# Patient Record
Sex: FEMALE | Race: WHITE
[De-identification: names, ages, dates, MRNs, and addresses within clinical notes are randomized per-mention and may not be internally consistent; named-entity substitution may affect disease eponyms.]

---

## 2020-12-17 ENCOUNTER — HOSPITAL ENCOUNTER (INPATIENT)
Age: 75
LOS: 23 days | Discharge: HOME HEALTH | DRG: 561 | End: 2021-01-09
Payer: MEDICARE

## 2020-12-17 VITALS
RESPIRATION RATE: 18 BRPM | HEART RATE: 78 BPM | SYSTOLIC BLOOD PRESSURE: 125 MMHG | TEMPERATURE: 97.52 F | DIASTOLIC BLOOD PRESSURE: 67 MMHG | OXYGEN SATURATION: 92 %

## 2020-12-17 VITALS
HEART RATE: 78 BPM | OXYGEN SATURATION: 92 % | DIASTOLIC BLOOD PRESSURE: 67 MMHG | SYSTOLIC BLOOD PRESSURE: 125 MMHG | TEMPERATURE: 97.6 F | RESPIRATION RATE: 18 BRPM

## 2020-12-17 VITALS — HEART RATE: 78 BPM | OXYGEN SATURATION: 92 % | RESPIRATION RATE: 18 BRPM

## 2020-12-17 VITALS — BODY MASS INDEX: 21.5 KG/M2 | BODY MASS INDEX: 21.6 KG/M2

## 2020-12-17 DIAGNOSIS — E78.5: ICD-10-CM

## 2020-12-17 DIAGNOSIS — G62.9: ICD-10-CM

## 2020-12-17 DIAGNOSIS — I10: ICD-10-CM

## 2020-12-17 DIAGNOSIS — E21.0: ICD-10-CM

## 2020-12-17 DIAGNOSIS — F32.9: ICD-10-CM

## 2020-12-17 DIAGNOSIS — S72.002D: Primary | ICD-10-CM

## 2020-12-17 DIAGNOSIS — Z87.891: ICD-10-CM

## 2020-12-17 DIAGNOSIS — G43.909: ICD-10-CM
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
## 2020-12-17 PROCEDURE — 87186 SC STD MICRODIL/AGAR DIL: CPT

## 2020-12-17 PROCEDURE — 81001 URINALYSIS AUTO W/SCOPE: CPT

## 2020-12-17 PROCEDURE — 97802 MEDICAL NUTRITION INDIV IN: CPT

## 2020-12-17 PROCEDURE — 97162 PT EVAL MOD COMPLEX 30 MIN: CPT

## 2020-12-17 PROCEDURE — 97110 THERAPEUTIC EXERCISES: CPT

## 2020-12-17 PROCEDURE — 80048 BASIC METABOLIC PNL TOTAL CA: CPT

## 2020-12-17 PROCEDURE — 85025 COMPLETE CBC W/AUTO DIFF WBC: CPT

## 2020-12-17 PROCEDURE — 36415 COLL VENOUS BLD VENIPUNCTURE: CPT

## 2020-12-17 PROCEDURE — 97535 SELF CARE MNGMENT TRAINING: CPT

## 2020-12-17 PROCEDURE — 87088 URINE BACTERIA CULTURE: CPT

## 2020-12-17 PROCEDURE — 97530 THERAPEUTIC ACTIVITIES: CPT

## 2020-12-17 PROCEDURE — U0003 INFECTIOUS AGENT DETECTION BY NUCLEIC ACID (DNA OR RNA); SEVERE ACUTE RESPIRATORY SYNDROME CORONAVIRUS 2 (SARS-COV-2) (CORONAVIRUS DISEASE [COVID-19]), AMPLIFIED PROBE TECHNIQUE, MAKING USE OF HIGH THROUGHPUT TECHNOLOGIES AS DESCRIBED BY CMS-2020-01-R: HCPCS

## 2020-12-17 PROCEDURE — 87086 URINE CULTURE/COLONY COUNT: CPT

## 2020-12-17 PROCEDURE — 97116 GAIT TRAINING THERAPY: CPT

## 2020-12-17 PROCEDURE — 87426 SARSCOV CORONAVIRUS AG IA: CPT

## 2020-12-17 PROCEDURE — 97166 OT EVAL MOD COMPLEX 45 MIN: CPT

## 2020-12-17 PROCEDURE — 87077 CULTURE AEROBIC IDENTIFY: CPT

## 2020-12-17 PROCEDURE — 87635 SARS-COV-2 COVID-19 AMP PRB: CPT

## 2020-12-18 VITALS
TEMPERATURE: 98 F | DIASTOLIC BLOOD PRESSURE: 61 MMHG | RESPIRATION RATE: 16 BRPM | OXYGEN SATURATION: 96 % | SYSTOLIC BLOOD PRESSURE: 118 MMHG | HEART RATE: 65 BPM

## 2020-12-18 VITALS
TEMPERATURE: 98.24 F | HEART RATE: 66 BPM | SYSTOLIC BLOOD PRESSURE: 136 MMHG | RESPIRATION RATE: 16 BRPM | DIASTOLIC BLOOD PRESSURE: 80 MMHG | OXYGEN SATURATION: 97 %

## 2020-12-18 LAB
ANION GAP: 4 (ref 5–15)
BUN SERPL-MCNC: 20 MG/DL (ref 7–18)
BUN/CREAT RATIO: 41.2 RATIO (ref 10–20)
CALCIUM SERPL-MCNC: 9.3 MG/DL (ref 8.5–10.1)
CARBON DIOXIDE: 30 MMOL/L (ref 21–32)
CHLORIDE: 105 MMOL/L (ref 98–107)
DEPRECATED RDW RBC: 44.8 FL (ref 35.1–43.9)
ERYTHROCYTE [DISTWIDTH] IN BLOOD: 13.2 % (ref 11.6–14.6)
EST GLOM FILT RATE - AFR AMER: 159 ML/MIN (ref 60–?)
ESTIMATED CREATININE CLEARANCE: 43.07 ML/MIN
GLUCOSE: 81 MG/DL (ref 74–106)
HCT VFR BLD AUTO: 36.3 % (ref 37–47)
HEMOGLOBIN: 11.5 G/DL (ref 12–15)
HGB BLD-MCNC: 11.5 G/DL (ref 12–15)
IMMATURE GRANULOCYTES COUNT: 0.02 X10^3/UL (ref 0–0)
MCV RBC: 92.6 FL (ref 81–99)
MEAN CORP HGB CONC: 31.7 G/DL (ref 32–36)
MEAN PLATELET VOL.: 9.8 FL (ref 6.2–12)
NRBC FLAGGED BY ANALYZER: 0 % (ref 0–5)
PLATELET # BLD: 319 K/MM3 (ref 150–450)
PLATELET COUNT: 319 K/MM3 (ref 150–450)
POTASSIUM: 3.3 MMOL/L (ref 3.5–5.1)
RBC # BLD AUTO: 3.92 M/MM3 (ref 4.2–5.4)
RBC DISTRIBUTION WIDTH CV: 13.2 % (ref 11.6–14.6)
RBC DISTRIBUTION WIDTH SD: 44.8 FL (ref 35.1–43.9)
WBC # BLD AUTO: 7.3 K/MM3 (ref 4.4–11)
WHITE BLOOD COUNT: 7.3 K/MM3 (ref 4.4–11)

## 2020-12-19 VITALS
TEMPERATURE: 97.88 F | OXYGEN SATURATION: 93 % | HEART RATE: 68 BPM | SYSTOLIC BLOOD PRESSURE: 102 MMHG | RESPIRATION RATE: 16 BRPM | DIASTOLIC BLOOD PRESSURE: 59 MMHG

## 2020-12-19 VITALS
RESPIRATION RATE: 16 BRPM | TEMPERATURE: 97.4 F | DIASTOLIC BLOOD PRESSURE: 75 MMHG | SYSTOLIC BLOOD PRESSURE: 146 MMHG | OXYGEN SATURATION: 93 % | HEART RATE: 67 BPM

## 2020-12-20 VITALS
TEMPERATURE: 98.6 F | HEART RATE: 63 BPM | SYSTOLIC BLOOD PRESSURE: 116 MMHG | DIASTOLIC BLOOD PRESSURE: 62 MMHG | RESPIRATION RATE: 16 BRPM | OXYGEN SATURATION: 96 %

## 2020-12-20 VITALS
DIASTOLIC BLOOD PRESSURE: 63 MMHG | OXYGEN SATURATION: 97 % | TEMPERATURE: 98.06 F | HEART RATE: 68 BPM | RESPIRATION RATE: 18 BRPM | SYSTOLIC BLOOD PRESSURE: 112 MMHG

## 2020-12-20 LAB
ANION GAP: 3 (ref 5–15)
BUN SERPL-MCNC: 18 MG/DL (ref 7–18)
BUN/CREAT RATIO: 35.3 RATIO (ref 10–20)
CALCIUM SERPL-MCNC: 9.3 MG/DL (ref 8.5–10.1)
CARBON DIOXIDE: 31 MMOL/L (ref 21–32)
CHLORIDE: 106 MMOL/L (ref 98–107)
EST GLOM FILT RATE - AFR AMER: 150 ML/MIN (ref 60–?)
ESTIMATED CREATININE CLEARANCE: 43.07 ML/MIN
GLUCOSE: 86 MG/DL (ref 74–106)
POTASSIUM: 4.3 MMOL/L (ref 3.5–5.1)

## 2020-12-21 VITALS
OXYGEN SATURATION: 97 % | SYSTOLIC BLOOD PRESSURE: 141 MMHG | DIASTOLIC BLOOD PRESSURE: 82 MMHG | HEART RATE: 64 BPM | RESPIRATION RATE: 18 BRPM | TEMPERATURE: 98.2 F

## 2020-12-21 VITALS
TEMPERATURE: 97.2 F | DIASTOLIC BLOOD PRESSURE: 63 MMHG | OXYGEN SATURATION: 95 % | SYSTOLIC BLOOD PRESSURE: 113 MMHG | HEART RATE: 63 BPM | RESPIRATION RATE: 18 BRPM

## 2020-12-22 VITALS
DIASTOLIC BLOOD PRESSURE: 49 MMHG | HEART RATE: 61 BPM | SYSTOLIC BLOOD PRESSURE: 102 MMHG | RESPIRATION RATE: 16 BRPM | OXYGEN SATURATION: 95 % | TEMPERATURE: 97.9 F

## 2020-12-22 VITALS
DIASTOLIC BLOOD PRESSURE: 62 MMHG | RESPIRATION RATE: 18 BRPM | OXYGEN SATURATION: 94 % | TEMPERATURE: 97.34 F | SYSTOLIC BLOOD PRESSURE: 138 MMHG | HEART RATE: 63 BPM

## 2020-12-23 VITALS — HEART RATE: 58 BPM | RESPIRATION RATE: 18 BRPM | OXYGEN SATURATION: 96 %

## 2020-12-23 VITALS
DIASTOLIC BLOOD PRESSURE: 51 MMHG | OXYGEN SATURATION: 95 % | SYSTOLIC BLOOD PRESSURE: 129 MMHG | TEMPERATURE: 98.06 F | RESPIRATION RATE: 16 BRPM | HEART RATE: 58 BPM

## 2020-12-23 VITALS
OXYGEN SATURATION: 96 % | SYSTOLIC BLOOD PRESSURE: 129 MMHG | TEMPERATURE: 98 F | RESPIRATION RATE: 18 BRPM | HEART RATE: 60 BPM | DIASTOLIC BLOOD PRESSURE: 50 MMHG

## 2020-12-24 VITALS
DIASTOLIC BLOOD PRESSURE: 54 MMHG | SYSTOLIC BLOOD PRESSURE: 114 MMHG | RESPIRATION RATE: 16 BRPM | TEMPERATURE: 97.34 F | OXYGEN SATURATION: 97 % | HEART RATE: 59 BPM

## 2020-12-24 VITALS
TEMPERATURE: 97.9 F | RESPIRATION RATE: 16 BRPM | DIASTOLIC BLOOD PRESSURE: 52 MMHG | HEART RATE: 60 BPM | OXYGEN SATURATION: 96 % | SYSTOLIC BLOOD PRESSURE: 118 MMHG

## 2020-12-25 VITALS
TEMPERATURE: 97.8 F | OXYGEN SATURATION: 96 % | HEART RATE: 55 BPM | DIASTOLIC BLOOD PRESSURE: 58 MMHG | RESPIRATION RATE: 16 BRPM | SYSTOLIC BLOOD PRESSURE: 123 MMHG

## 2020-12-25 VITALS
RESPIRATION RATE: 16 BRPM | OXYGEN SATURATION: 95 % | TEMPERATURE: 98.24 F | HEART RATE: 65 BPM | SYSTOLIC BLOOD PRESSURE: 111 MMHG | DIASTOLIC BLOOD PRESSURE: 50 MMHG

## 2020-12-25 VITALS — HEART RATE: 62 BPM | RESPIRATION RATE: 16 BRPM | OXYGEN SATURATION: 95 %

## 2020-12-25 LAB
ANION GAP: 3 (ref 5–15)
BUN SERPL-MCNC: 23 MG/DL (ref 7–18)
BUN/CREAT RATIO: 38.2 RATIO (ref 10–20)
CALCIUM SERPL-MCNC: 9.5 MG/DL (ref 8.5–10.1)
CARBON DIOXIDE: 29 MMOL/L (ref 21–32)
CHLORIDE: 106 MMOL/L (ref 98–107)
DEPRECATED RDW RBC: 47.1 FL (ref 35.1–43.9)
ERYTHROCYTE [DISTWIDTH] IN BLOOD: 13.6 % (ref 11.6–14.6)
EST GLOM FILT RATE - AFR AMER: 124 ML/MIN (ref 60–?)
ESTIMATED CREATININE CLEARANCE: 43.07 ML/MIN
GLUCOSE: 82 MG/DL (ref 74–106)
HCT VFR BLD AUTO: 37.3 % (ref 37–47)
HEMOGLOBIN: 11.7 G/DL (ref 12–15)
HGB BLD-MCNC: 11.7 G/DL (ref 12–15)
IMMATURE GRANULOCYTES COUNT: 0.05 X10^3/UL (ref 0–0)
MCV RBC: 94.4 FL (ref 81–99)
MEAN CORP HGB CONC: 31.4 G/DL (ref 32–36)
MEAN PLATELET VOL.: 9.2 FL (ref 6.2–12)
NRBC FLAGGED BY ANALYZER: 0 % (ref 0–5)
PLATELET # BLD: 471 K/MM3 (ref 150–450)
PLATELET COUNT: 471 K/MM3 (ref 150–450)
POTASSIUM: 4.3 MMOL/L (ref 3.5–5.1)
RBC # BLD AUTO: 3.95 M/MM3 (ref 4.2–5.4)
RBC DISTRIBUTION WIDTH CV: 13.6 % (ref 11.6–14.6)
RBC DISTRIBUTION WIDTH SD: 47.1 FL (ref 35.1–43.9)
WBC # BLD AUTO: 8.9 K/MM3 (ref 4.4–11)
WHITE BLOOD COUNT: 8.9 K/MM3 (ref 4.4–11)

## 2020-12-26 VITALS
SYSTOLIC BLOOD PRESSURE: 121 MMHG | DIASTOLIC BLOOD PRESSURE: 60 MMHG | HEART RATE: 57 BPM | TEMPERATURE: 98.3 F | RESPIRATION RATE: 16 BRPM | OXYGEN SATURATION: 93 %

## 2020-12-26 VITALS
DIASTOLIC BLOOD PRESSURE: 75 MMHG | OXYGEN SATURATION: 94 % | SYSTOLIC BLOOD PRESSURE: 135 MMHG | TEMPERATURE: 97.3 F | HEART RATE: 59 BPM | RESPIRATION RATE: 16 BRPM

## 2020-12-27 VITALS
HEART RATE: 58 BPM | RESPIRATION RATE: 16 BRPM | TEMPERATURE: 97.52 F | OXYGEN SATURATION: 95 % | DIASTOLIC BLOOD PRESSURE: 63 MMHG | SYSTOLIC BLOOD PRESSURE: 109 MMHG

## 2020-12-27 VITALS
DIASTOLIC BLOOD PRESSURE: 73 MMHG | HEART RATE: 62 BPM | SYSTOLIC BLOOD PRESSURE: 142 MMHG | RESPIRATION RATE: 16 BRPM | OXYGEN SATURATION: 97 % | TEMPERATURE: 97.4 F

## 2020-12-27 VITALS — RESPIRATION RATE: 16 BRPM | HEART RATE: 64 BPM | OXYGEN SATURATION: 97 %

## 2020-12-28 ENCOUNTER — HOSPITAL ENCOUNTER (OUTPATIENT)
Age: 75
End: 2020-12-28
Payer: MEDICARE

## 2020-12-28 VITALS
OXYGEN SATURATION: 96 % | TEMPERATURE: 98 F | HEART RATE: 58 BPM | RESPIRATION RATE: 16 BRPM | SYSTOLIC BLOOD PRESSURE: 112 MMHG | DIASTOLIC BLOOD PRESSURE: 62 MMHG

## 2020-12-28 VITALS
DIASTOLIC BLOOD PRESSURE: 79 MMHG | SYSTOLIC BLOOD PRESSURE: 107 MMHG | TEMPERATURE: 98.06 F | RESPIRATION RATE: 16 BRPM | OXYGEN SATURATION: 96 % | HEART RATE: 66 BPM

## 2020-12-28 VITALS — BODY MASS INDEX: 21.5 KG/M2

## 2020-12-28 DIAGNOSIS — M79.89: Primary | ICD-10-CM

## 2020-12-28 LAB
KETONE-DIPSTICK: 5 MG/DL
LEUKOCYTE ESTERASE UR QL STRIP: 500 /UL
MUCOUS THREADS URNS QL MICRO: (no result) /HPF
PROT UR QL STRIP.AUTO: 100 MG/DL
RBC UR QL: (no result) /HPF (ref 0–5)
RBC UR QL: 50 /UL
SP GR UR: 1.02 (ref 1–1.03)
SQUAMOUS URNS QL MICRO: (no result) /HPF (ref 5–10)
URINE PRESERVATIVE: (no result)

## 2020-12-28 PROCEDURE — 93971 EXTREMITY STUDY: CPT

## 2020-12-29 VITALS
HEART RATE: 71 BPM | RESPIRATION RATE: 18 BRPM | DIASTOLIC BLOOD PRESSURE: 71 MMHG | TEMPERATURE: 98.1 F | OXYGEN SATURATION: 94 % | SYSTOLIC BLOOD PRESSURE: 105 MMHG

## 2020-12-29 VITALS
HEART RATE: 60 BPM | DIASTOLIC BLOOD PRESSURE: 53 MMHG | RESPIRATION RATE: 16 BRPM | SYSTOLIC BLOOD PRESSURE: 125 MMHG | TEMPERATURE: 98.24 F | OXYGEN SATURATION: 95 %

## 2020-12-30 VITALS
DIASTOLIC BLOOD PRESSURE: 42 MMHG | HEART RATE: 70 BPM | OXYGEN SATURATION: 96 % | RESPIRATION RATE: 16 BRPM | SYSTOLIC BLOOD PRESSURE: 138 MMHG | TEMPERATURE: 97.52 F

## 2020-12-30 VITALS
RESPIRATION RATE: 16 BRPM | DIASTOLIC BLOOD PRESSURE: 57 MMHG | HEART RATE: 58 BPM | OXYGEN SATURATION: 97 % | SYSTOLIC BLOOD PRESSURE: 128 MMHG | TEMPERATURE: 98.3 F

## 2020-12-31 VITALS
DIASTOLIC BLOOD PRESSURE: 56 MMHG | OXYGEN SATURATION: 98 % | RESPIRATION RATE: 16 BRPM | SYSTOLIC BLOOD PRESSURE: 122 MMHG | HEART RATE: 58 BPM | TEMPERATURE: 98.6 F

## 2020-12-31 VITALS
OXYGEN SATURATION: 98 % | SYSTOLIC BLOOD PRESSURE: 113 MMHG | HEART RATE: 58 BPM | RESPIRATION RATE: 16 BRPM | TEMPERATURE: 98.06 F | DIASTOLIC BLOOD PRESSURE: 66 MMHG

## 2020-12-31 VITALS — HEART RATE: 62 BPM | OXYGEN SATURATION: 96 % | RESPIRATION RATE: 16 BRPM

## 2021-01-01 VITALS
OXYGEN SATURATION: 98 % | TEMPERATURE: 97.9 F | HEART RATE: 68 BPM | RESPIRATION RATE: 16 BRPM | SYSTOLIC BLOOD PRESSURE: 118 MMHG | DIASTOLIC BLOOD PRESSURE: 70 MMHG

## 2021-01-01 LAB
ANION GAP: 2 (ref 5–15)
BUN SERPL-MCNC: 27 MG/DL (ref 7–18)
BUN/CREAT RATIO: 39.6 RATIO (ref 10–20)
CALCIUM SERPL-MCNC: 9.8 MG/DL (ref 8.5–10.1)
CARBON DIOXIDE: 31 MMOL/L (ref 21–32)
CHLORIDE: 109 MMOL/L (ref 98–107)
DEPRECATED RDW RBC: 50.9 FL (ref 35.1–43.9)
ERYTHROCYTE [DISTWIDTH] IN BLOOD: 14.6 % (ref 11.6–14.6)
EST GLOM FILT RATE - AFR AMER: 108 ML/MIN (ref 60–?)
ESTIMATED CREATININE CLEARANCE: 43.07 ML/MIN
GLUCOSE: 75 MG/DL (ref 74–106)
HCT VFR BLD AUTO: 38.9 % (ref 37–47)
HEMOGLOBIN: 12.1 G/DL (ref 12–15)
HGB BLD-MCNC: 12.1 G/DL (ref 12–15)
IMMATURE GRANULOCYTES COUNT: 0.03 X10^3/UL (ref 0–0)
MCV RBC: 95.6 FL (ref 81–99)
MEAN CORP HGB CONC: 31.1 G/DL (ref 32–36)
MEAN PLATELET VOL.: 9.3 FL (ref 6.2–12)
NRBC FLAGGED BY ANALYZER: 0 % (ref 0–5)
PLATELET # BLD: 519 K/MM3 (ref 150–450)
PLATELET COUNT: 519 K/MM3 (ref 150–450)
POTASSIUM: 4.4 MMOL/L (ref 3.5–5.1)
RBC # BLD AUTO: 4.07 M/MM3 (ref 4.2–5.4)
RBC DISTRIBUTION WIDTH CV: 14.6 % (ref 11.6–14.6)
RBC DISTRIBUTION WIDTH SD: 50.9 FL (ref 35.1–43.9)
WBC # BLD AUTO: 7.1 K/MM3 (ref 4.4–11)
WHITE BLOOD COUNT: 7.1 K/MM3 (ref 4.4–11)

## 2021-01-02 VITALS
HEART RATE: 62 BPM | TEMPERATURE: 97.52 F | DIASTOLIC BLOOD PRESSURE: 47 MMHG | OXYGEN SATURATION: 96 % | SYSTOLIC BLOOD PRESSURE: 112 MMHG | RESPIRATION RATE: 16 BRPM

## 2021-01-02 VITALS
RESPIRATION RATE: 18 BRPM | TEMPERATURE: 97.88 F | HEART RATE: 57 BPM | DIASTOLIC BLOOD PRESSURE: 73 MMHG | SYSTOLIC BLOOD PRESSURE: 115 MMHG | OXYGEN SATURATION: 96 %

## 2021-01-03 VITALS
HEART RATE: 60 BPM | OXYGEN SATURATION: 94 % | RESPIRATION RATE: 18 BRPM | TEMPERATURE: 97.34 F | DIASTOLIC BLOOD PRESSURE: 68 MMHG | SYSTOLIC BLOOD PRESSURE: 119 MMHG

## 2021-01-03 VITALS
OXYGEN SATURATION: 97 % | TEMPERATURE: 98.8 F | HEART RATE: 57 BPM | DIASTOLIC BLOOD PRESSURE: 56 MMHG | SYSTOLIC BLOOD PRESSURE: 107 MMHG | RESPIRATION RATE: 16 BRPM

## 2021-01-04 VITALS
TEMPERATURE: 96.8 F | RESPIRATION RATE: 16 BRPM | HEART RATE: 61 BPM | SYSTOLIC BLOOD PRESSURE: 111 MMHG | DIASTOLIC BLOOD PRESSURE: 54 MMHG | OXYGEN SATURATION: 96 %

## 2021-01-04 VITALS
TEMPERATURE: 96.98 F | HEART RATE: 58 BPM | OXYGEN SATURATION: 98 % | DIASTOLIC BLOOD PRESSURE: 72 MMHG | RESPIRATION RATE: 16 BRPM | SYSTOLIC BLOOD PRESSURE: 104 MMHG

## 2021-01-04 VITALS — OXYGEN SATURATION: 99 % | RESPIRATION RATE: 16 BRPM | HEART RATE: 61 BPM

## 2021-01-05 VITALS
DIASTOLIC BLOOD PRESSURE: 59 MMHG | HEART RATE: 56 BPM | OXYGEN SATURATION: 95 % | RESPIRATION RATE: 16 BRPM | SYSTOLIC BLOOD PRESSURE: 109 MMHG | TEMPERATURE: 97.88 F

## 2021-01-05 VITALS
RESPIRATION RATE: 18 BRPM | DIASTOLIC BLOOD PRESSURE: 79 MMHG | OXYGEN SATURATION: 93 % | SYSTOLIC BLOOD PRESSURE: 103 MMHG | HEART RATE: 62 BPM | TEMPERATURE: 98.42 F

## 2021-01-06 VITALS
DIASTOLIC BLOOD PRESSURE: 53 MMHG | SYSTOLIC BLOOD PRESSURE: 114 MMHG | OXYGEN SATURATION: 98 % | RESPIRATION RATE: 16 BRPM | HEART RATE: 59 BPM | TEMPERATURE: 97.7 F

## 2021-01-06 VITALS
OXYGEN SATURATION: 92 % | SYSTOLIC BLOOD PRESSURE: 112 MMHG | HEART RATE: 60 BPM | TEMPERATURE: 98.6 F | DIASTOLIC BLOOD PRESSURE: 53 MMHG | RESPIRATION RATE: 16 BRPM

## 2021-01-06 VITALS — HEART RATE: 60 BPM | OXYGEN SATURATION: 92 % | RESPIRATION RATE: 16 BRPM

## 2021-01-07 VITALS
DIASTOLIC BLOOD PRESSURE: 56 MMHG | HEART RATE: 64 BPM | OXYGEN SATURATION: 98 % | RESPIRATION RATE: 16 BRPM | TEMPERATURE: 97.88 F | SYSTOLIC BLOOD PRESSURE: 100 MMHG

## 2021-01-07 VITALS
TEMPERATURE: 96.98 F | HEART RATE: 80 BPM | RESPIRATION RATE: 18 BRPM | OXYGEN SATURATION: 97 % | DIASTOLIC BLOOD PRESSURE: 56 MMHG | SYSTOLIC BLOOD PRESSURE: 103 MMHG

## 2021-01-08 VITALS
OXYGEN SATURATION: 95 % | RESPIRATION RATE: 16 BRPM | TEMPERATURE: 98.4 F | HEART RATE: 60 BPM | SYSTOLIC BLOOD PRESSURE: 123 MMHG | DIASTOLIC BLOOD PRESSURE: 65 MMHG

## 2021-01-08 VITALS
RESPIRATION RATE: 16 BRPM | SYSTOLIC BLOOD PRESSURE: 112 MMHG | OXYGEN SATURATION: 97 % | DIASTOLIC BLOOD PRESSURE: 66 MMHG | HEART RATE: 58 BPM | TEMPERATURE: 97.52 F

## 2021-01-08 VITALS — RESPIRATION RATE: 16 BRPM | HEART RATE: 60 BPM | OXYGEN SATURATION: 95 %

## 2021-01-08 LAB
ANION GAP: 1 (ref 5–15)
BUN SERPL-MCNC: 19 MG/DL (ref 7–18)
BUN/CREAT RATIO: 24.3 RATIO (ref 10–20)
CALCIUM SERPL-MCNC: 9.9 MG/DL (ref 8.5–10.1)
CARBON DIOXIDE: 32 MMOL/L (ref 21–32)
CHLORIDE: 107 MMOL/L (ref 98–107)
DEPRECATED RDW RBC: 53 FL (ref 35.1–43.9)
ERYTHROCYTE [DISTWIDTH] IN BLOOD: 15 % (ref 11.6–14.6)
EST GLOM FILT RATE - AFR AMER: 92 ML/MIN (ref 60–?)
ESTIMATED CREATININE CLEARANCE: 43.07 ML/MIN
GLUCOSE: 80 MG/DL (ref 74–106)
HCT VFR BLD AUTO: 39.6 % (ref 37–47)
HEMOGLOBIN: 12.3 G/DL (ref 12–15)
HGB BLD-MCNC: 12.3 G/DL (ref 12–15)
IMMATURE GRANULOCYTES COUNT: 0.02 X10^3/UL (ref 0–0)
MCV RBC: 96.1 FL (ref 81–99)
MEAN CORP HGB CONC: 31.1 G/DL (ref 32–36)
MEAN PLATELET VOL.: 9.5 FL (ref 6.2–12)
NRBC FLAGGED BY ANALYZER: 0 % (ref 0–5)
PLATELET # BLD: 384 K/MM3 (ref 150–450)
PLATELET COUNT: 384 K/MM3 (ref 150–450)
POTASSIUM: 4.4 MMOL/L (ref 3.5–5.1)
RBC # BLD AUTO: 4.12 M/MM3 (ref 4.2–5.4)
RBC DISTRIBUTION WIDTH CV: 15 % (ref 11.6–14.6)
RBC DISTRIBUTION WIDTH SD: 53 FL (ref 35.1–43.9)
WBC # BLD AUTO: 6.7 K/MM3 (ref 4.4–11)
WHITE BLOOD COUNT: 6.7 K/MM3 (ref 4.4–11)

## 2021-01-09 VITALS
DIASTOLIC BLOOD PRESSURE: 63 MMHG | SYSTOLIC BLOOD PRESSURE: 120 MMHG | RESPIRATION RATE: 16 BRPM | OXYGEN SATURATION: 94 % | TEMPERATURE: 98.6 F | HEART RATE: 64 BPM

## 2021-01-09 VITALS
HEART RATE: 56 BPM | DIASTOLIC BLOOD PRESSURE: 83 MMHG | OXYGEN SATURATION: 100 % | TEMPERATURE: 97.2 F | RESPIRATION RATE: 16 BRPM | SYSTOLIC BLOOD PRESSURE: 116 MMHG

## 2021-01-09 VITALS — HEART RATE: 64 BPM | RESPIRATION RATE: 16 BRPM | OXYGEN SATURATION: 94 %

## 2024-04-16 ENCOUNTER — NURSING HOME VISIT (OUTPATIENT)
Dept: POST ACUTE CARE | Facility: EXTERNAL LOCATION | Age: 79
End: 2024-04-16
Payer: MEDICARE

## 2024-04-16 DIAGNOSIS — G62.9 NEUROPATHY: ICD-10-CM

## 2024-04-16 DIAGNOSIS — F01.B0 MODERATE VASCULAR DEMENTIA WITHOUT BEHAVIORAL DISTURBANCE, PSYCHOTIC DISTURBANCE, MOOD DISTURBANCE, OR ANXIETY (MULTI): ICD-10-CM

## 2024-04-16 DIAGNOSIS — R53.1 WEAKNESS: Primary | ICD-10-CM

## 2024-04-16 PROCEDURE — 99308 SBSQ NF CARE LOW MDM 20: CPT | Performed by: REGISTERED NURSE

## 2024-04-16 NOTE — LETTER
Patient: Silvia Wade  : 1945    Encounter Date: 2024    PROGRESS NOTE    Subjective  Chief complaint: Silvia aWde is a 79 y.o. female who is an acute skilled patient being seen and evaluated for weakness    HPI:  24 Patient admitted to SNF for therapy d/t weakness after recent hospitalization.   Patient requires assist with ADLs and transfers.  No new complaints.      Objective  Vital signs: 125/73, 97.6, 87, 18, 97%    Physical Exam  Constitutional:       General: She is not in acute distress.  Eyes:      Extraocular Movements: Extraocular movements intact.   Pulmonary:      Effort: Pulmonary effort is normal.   Musculoskeletal:      Cervical back: Neck supple.   Neurological:      Mental Status: She is alert.   Psychiatric:         Mood and Affect: Mood normal.         Behavior: Behavior is cooperative.         Assessment/Plan  Problem List Items Addressed This Visit       Moderate vascular dementia without behavioral disturbance, psychotic disturbance, mood disturbance, or anxiety (Multi)      maintain safety in secured MCU   Xanax as needed         Weakness - Primary     Pt/ot          Neuropathy     Stable   Continue current medications           Medications, treatments, and labs reviewed  Continue medications and treatments as listed in [facility]    Scribe Attestation  I, Gina Shaw   attest that this documentation has been prepared under the direction and in the presence of DOMITILA Stafford.    Provider Attestation - Scribe documentation  All medical record entries made by the Scribe were at my direction and personally dictated by me. I have reviewed the chart and agree that the record accurately reflects my personal performance of the history, physical exam, discussion and plan.    DOMITILA Stafford        Electronically Signed By: DOMITILA Stafford   24 12:28 PM

## 2024-04-17 ENCOUNTER — NURSING HOME VISIT (OUTPATIENT)
Dept: POST ACUTE CARE | Facility: EXTERNAL LOCATION | Age: 79
End: 2024-04-17
Payer: MEDICARE

## 2024-04-17 DIAGNOSIS — R53.1 WEAKNESS: Primary | ICD-10-CM

## 2024-04-17 DIAGNOSIS — G62.9 NEUROPATHY: ICD-10-CM

## 2024-04-17 DIAGNOSIS — S32.512D FRACTURE OF SUPERIOR RIM OF LEFT PUBIS, SUBSEQUENT ENCOUNTER FOR FRACTURE WITH ROUTINE HEALING: ICD-10-CM

## 2024-04-17 PROCEDURE — 99308 SBSQ NF CARE LOW MDM 20: CPT | Performed by: REGISTERED NURSE

## 2024-04-17 NOTE — PROGRESS NOTES
PROGRESS NOTE    Subjective   Chief complaint: Silvia Wade is a 79 y.o. female who is an acute skilled patient being seen and evaluated for weakness    HPI:  4/16/24 Patient admitted to SNF for therapy d/t weakness after recent hospitalization.   Patient requires assist with ADLs and transfers.  No new complaints.      Objective   Vital signs: 125/73, 97.6, 87, 18, 97%    Physical Exam  Constitutional:       General: She is not in acute distress.  Eyes:      Extraocular Movements: Extraocular movements intact.   Pulmonary:      Effort: Pulmonary effort is normal.   Musculoskeletal:      Cervical back: Neck supple.   Neurological:      Mental Status: She is alert.   Psychiatric:         Mood and Affect: Mood normal.         Behavior: Behavior is cooperative.         Assessment/Plan   Problem List Items Addressed This Visit       Moderate vascular dementia without behavioral disturbance, psychotic disturbance, mood disturbance, or anxiety (Multi)      maintain safety in secured MCU   Xanax as needed         Weakness - Primary     Pt/ot          Neuropathy     Stable   Continue current medications           Medications, treatments, and labs reviewed  Continue medications and treatments as listed in [facility]    Scribe Attestation  IRubi Scribe   attest that this documentation has been prepared under the direction and in the presence of DOMITILA Stafford.    Provider Attestation - Scribe documentation  All medical record entries made by the Scribe were at my direction and personally dictated by me. I have reviewed the chart and agree that the record accurately reflects my personal performance of the history, physical exam, discussion and plan.    DOMITILA Stafford

## 2024-04-17 NOTE — LETTER
Patient: Silvia Wade  : 1945    Encounter Date: 2024    PROGRESS NOTE    Subjective  Chief complaint: Silvia Wade is a 79 y.o. female who is an acute skilled patient being seen and evaluated for weakness    HPI:  24 Patient admitted to SNF for therapy d/t weakness after recent hospitalization.   Patient requires assist with ADLs and transfers.  No new complaints.      24 Patient has no new complaints or concerns today.  Patient is working in therapy.  Denies n/v/f/c.    Objective  Vital signs: 125/73, 97.6, 87, 18, 97%    Physical Exam  Constitutional:       General: She is not in acute distress.  Eyes:      Extraocular Movements: Extraocular movements intact.   Pulmonary:      Effort: Pulmonary effort is normal.   Musculoskeletal:      Cervical back: Neck supple.   Neurological:      Mental Status: She is alert.   Psychiatric:         Mood and Affect: Mood normal.         Behavior: Behavior is cooperative.         Assessment/Plan  Problem List Items Addressed This Visit       Fracture of superior rim of left pubis, subsequent encounter for fracture with routine healing      Stable   pain management   Therapy   wheelchair for mobility         Weakness - Primary     Pt/ot          Neuropathy     Stable   Continue current medications           Medications, treatments, and labs reviewed  Continue medications and treatments as listed in [facility]    Scribe Attestation  IRubi Scribe   attest that this documentation has been prepared under the direction and in the presence of DOMITILA Stafford.    Provider Attestation - Scribe documentation  All medical record entries made by the Scribe were at my direction and personally dictated by me. I have reviewed the chart and agree that the record accurately reflects my personal performance of the history, physical exam, discussion and plan.    DOMITILA Stafford        Electronically Signed By: Rufino JONES  RICARDO Collins-CNP   5/9/24 10:20 AM

## 2024-04-18 ENCOUNTER — NURSING HOME VISIT (OUTPATIENT)
Dept: POST ACUTE CARE | Facility: EXTERNAL LOCATION | Age: 79
End: 2024-04-18
Payer: MEDICARE

## 2024-04-18 DIAGNOSIS — I10 HYPERTENSION, ESSENTIAL: ICD-10-CM

## 2024-04-18 DIAGNOSIS — R53.1 WEAKNESS: ICD-10-CM

## 2024-04-18 DIAGNOSIS — F01.B0 MODERATE VASCULAR DEMENTIA WITHOUT BEHAVIORAL DISTURBANCE, PSYCHOTIC DISTURBANCE, MOOD DISTURBANCE, OR ANXIETY (MULTI): ICD-10-CM

## 2024-04-18 DIAGNOSIS — S32.512D FRACTURE OF SUPERIOR RIM OF LEFT PUBIS, SUBSEQUENT ENCOUNTER FOR FRACTURE WITH ROUTINE HEALING: Primary | ICD-10-CM

## 2024-04-18 DIAGNOSIS — F33.0 MILD EPISODE OF RECURRENT MAJOR DEPRESSIVE DISORDER (CMS-HCC): ICD-10-CM

## 2024-04-18 PROCEDURE — 99309 SBSQ NF CARE MODERATE MDM 30: CPT

## 2024-04-18 NOTE — PROGRESS NOTES
PROGRESS NOTE    Subjective   Chief complaint: Silvia Wade is a 79 y.o. female who is an acute skilled patient being seen and evaluated for weakness    HPI:  4/16/24 Patient admitted to SNF for therapy d/t weakness after recent hospitalization.   Patient requires assist with ADLs and transfers.  No new complaints.      4/17/24 Patient has no new complaints or concerns today.  Patient is working in therapy.  Denies n/v/f/c.    Objective   Vital signs: 125/73, 97.6, 87, 18, 97%    Physical Exam  Constitutional:       General: She is not in acute distress.  Eyes:      Extraocular Movements: Extraocular movements intact.   Pulmonary:      Effort: Pulmonary effort is normal.   Musculoskeletal:      Cervical back: Neck supple.   Neurological:      Mental Status: She is alert.   Psychiatric:         Mood and Affect: Mood normal.         Behavior: Behavior is cooperative.         Assessment/Plan   Problem List Items Addressed This Visit       Fracture of superior rim of left pubis, subsequent encounter for fracture with routine healing      Stable   pain management   Therapy   wheelchair for mobility         Weakness - Primary     Pt/ot          Neuropathy     Stable   Continue current medications           Medications, treatments, and labs reviewed  Continue medications and treatments as listed in [facility]    Scribe Attestation  I, Gina Shaw   attest that this documentation has been prepared under the direction and in the presence of DOMITILA Stafford.    Provider Attestation - Scribe documentation  All medical record entries made by the Scribe were at my direction and personally dictated by me. I have reviewed the chart and agree that the record accurately reflects my personal performance of the history, physical exam, discussion and plan.    DOMITILA Stafford

## 2024-04-18 NOTE — LETTER
Patient: Silvia Wade  : 1945    Encounter Date: 2024    PROGRESS NOTE    Subjective  Chief complaint: Silvia Wade is a 79 y.o. female who is an acute skilled patient being seen and evaluated for weakness    HPI:  24 Patient admitted to SNF for therapy d/t weakness after recent hospitalization.   Patient requires assist with ADLs and transfers.  No new complaints.      24 Patient has no new complaints or concerns today.  Patient is working in therapy.  Denies n/v/f/c.    24.  Patient states doing well.  Continues to work with therapy for mobility, transferring, strengthening.  No F/C/N/V/D, SOB, cough.  No concerns per nursing          Objective  Vital signs:  125/73-97.6-87-18-97%    Physical Exam  Constitutional:       Appearance: Normal appearance.   HENT:      Head: Normocephalic.      Mouth/Throat:      Mouth: Mucous membranes are moist.   Eyes:      Extraocular Movements: Extraocular movements intact.   Cardiovascular:      Rate and Rhythm: Normal rate and regular rhythm.      Pulses: Normal pulses.      Heart sounds: Murmur heard.   Pulmonary:      Effort: Pulmonary effort is normal.      Breath sounds: Normal breath sounds.   Abdominal:      General: Bowel sounds are normal.      Palpations: Abdomen is soft.   Musculoskeletal:         General: Normal range of motion.      Cervical back: Normal range of motion and neck supple.      Comments:  generalized weakness   Skin:     General: Skin is warm and dry.      Capillary Refill: Capillary refill takes less than 2 seconds.   Neurological:      Mental Status: She is alert. Mental status is at baseline.   Psychiatric:         Mood and Affect: Mood normal.         Behavior: Behavior normal.         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

## 2024-04-19 ENCOUNTER — NURSING HOME VISIT (OUTPATIENT)
Dept: POST ACUTE CARE | Facility: EXTERNAL LOCATION | Age: 79
End: 2024-04-19
Payer: MEDICARE

## 2024-04-19 DIAGNOSIS — S32.512D FRACTURE OF SUPERIOR RIM OF LEFT PUBIS, SUBSEQUENT ENCOUNTER FOR FRACTURE WITH ROUTINE HEALING: Primary | ICD-10-CM

## 2024-04-19 DIAGNOSIS — R53.1 WEAKNESS: ICD-10-CM

## 2024-04-19 DIAGNOSIS — F01.B0 MODERATE VASCULAR DEMENTIA WITHOUT BEHAVIORAL DISTURBANCE, PSYCHOTIC DISTURBANCE, MOOD DISTURBANCE, OR ANXIETY (MULTI): ICD-10-CM

## 2024-04-19 DIAGNOSIS — I10 HYPERTENSION, ESSENTIAL: ICD-10-CM

## 2024-04-19 PROCEDURE — 99308 SBSQ NF CARE LOW MDM 20: CPT

## 2024-04-19 NOTE — LETTER
Patient: Silvia Wade  : 1945    Encounter Date: 2024    PROGRESS NOTE    Subjective  Chief complaint: Silvia Wade is a 79 y.o. female who is an acute skilled patient being seen and evaluated for weakness    HPI:  24 Patient admitted to SNF for therapy d/t weakness after recent hospitalization.   Patient requires assist with ADLs and transfers.  No new complaints.      24 Patient has no new complaints or concerns today.  Patient is working in therapy.  Denies n/v/f/c.    24.  Patient states doing well.  Continues to work with therapy for mobility, transferring, strengthening.  No F/C/N/V/D, SOB, cough.  No concerns per nursing    24 Continues to work with therapy for mobility and transferring.   Pain managed with current therapy.  Safety maintained in MCU.  No F/C/N/V/D, SOB, cough.  No concerns per nursing          Objective  Vital signs: 125/73-97.6-87-18-97%    Physical Exam  Constitutional:       Appearance: Normal appearance.   HENT:      Head: Normocephalic.      Mouth/Throat:      Mouth: Mucous membranes are moist.   Eyes:      Extraocular Movements: Extraocular movements intact.   Cardiovascular:      Rate and Rhythm: Normal rate and regular rhythm.      Pulses: Normal pulses.      Heart sounds: Murmur heard.   Pulmonary:      Effort: Pulmonary effort is normal.      Breath sounds: Normal breath sounds.   Abdominal:      General: Bowel sounds are normal.      Palpations: Abdomen is soft.   Musculoskeletal:         General: Normal range of motion.      Cervical back: Normal range of motion and neck supple.      Comments:  generalized weakness   Skin:     General: Skin is warm and dry.      Capillary Refill: Capillary refill takes less than 2 seconds.   Neurological:      Mental Status: She is alert. Mental status is at baseline.   Psychiatric:         Mood and Affect: Mood normal.         Behavior: Behavior normal.         Assessment/Plan  Problem List Items Addressed  This Visit       Moderate vascular dementia without behavioral disturbance, psychotic disturbance, mood disturbance, or anxiety (Multi)      maintain safety in secured MCU   Xanax as needed         Fracture of superior rim of left pubis, subsequent encounter for fracture with routine healing - Primary      Stable   pain management   Therapy   wheelchair for mobility         Weakness      therapy         Hypertension, essential      BP at goal   losartan hydrochlorothiazide   monitor BP   routine BMP          Medications, treatments, and labs reviewed  Continue medications and treatments as listed in EMR    DOMITILA Landon      Electronically Signed By: DOMITILA Landon   4/26/24  9:12 AM

## 2024-04-22 ENCOUNTER — NURSING HOME VISIT (OUTPATIENT)
Dept: POST ACUTE CARE | Facility: EXTERNAL LOCATION | Age: 79
End: 2024-04-22
Payer: MEDICARE

## 2024-04-22 DIAGNOSIS — I10 HYPERTENSION, ESSENTIAL: ICD-10-CM

## 2024-04-22 DIAGNOSIS — S32.512D FRACTURE OF SUPERIOR RIM OF LEFT PUBIS, SUBSEQUENT ENCOUNTER FOR FRACTURE WITH ROUTINE HEALING: ICD-10-CM

## 2024-04-22 DIAGNOSIS — R53.1 WEAKNESS: Primary | ICD-10-CM

## 2024-04-22 DIAGNOSIS — G62.9 NEUROPATHY: ICD-10-CM

## 2024-04-22 PROCEDURE — 99309 SBSQ NF CARE MODERATE MDM 30: CPT | Performed by: REGISTERED NURSE

## 2024-04-22 NOTE — LETTER
Patient: Silvia Wade  : 1945    Encounter Date: 2024    PROGRESS NOTE    Subjective  Chief complaint: Silvia Wade is a 79 y.o. female who is an acute skilled patient being seen and evaluated for weakness    HPI:  24 Patient admitted to SNF for therapy d/t weakness after recent hospitalization.   Patient requires assist with ADLs and transfers.  No new complaints.      24 Patient has no new complaints or concerns today.  Patient is working in therapy.  Denies n/v/f/c.    24 Therapy has been working with the patient to improve strength and endurance with ADLs, transfers, and mobility.  Patient continues to work toward goals.  Patient is stable and has no new complaints.  Nursing staff voices no new concerns today.    Objective  Vital signs: 134/71, 97.7, 76, 18, 96%    Physical Exam  Constitutional:       General: She is not in acute distress.  Eyes:      Extraocular Movements: Extraocular movements intact.   Pulmonary:      Effort: Pulmonary effort is normal.   Musculoskeletal:      Cervical back: Neck supple.   Neurological:      Mental Status: She is alert.   Psychiatric:         Mood and Affect: Mood normal.         Behavior: Behavior is cooperative.         Assessment/Plan  Problem List Items Addressed This Visit       Fracture of superior rim of left pubis, subsequent encounter for fracture with routine healing      Stable   pain management   Therapy   wheelchair for mobility         Weakness - Primary     Pt/ot          Neuropathy     Stable   Continue current medications          Hypertension, essential      BP at goal   losartan hydrochlorothiazide   monitor BP   routine BMP             Medications, treatments, and labs reviewed  Continue medications and treatments as listed in [facility]    Scribe Attestation  I, Gina Shaw   attest that this documentation has been prepared under the direction and in the presence of DOMITILA Stafford.    Provider  Attestation - Scribe documentation  All medical record entries made by the Scribe were at my direction and personally dictated by me. I have reviewed the chart and agree that the record accurately reflects my personal performance of the history, physical exam, discussion and plan.    DOMITILA Stafford        Electronically Signed By: DOMITILA Stafford   5/16/24  5:17 PM

## 2024-04-23 ENCOUNTER — NURSING HOME VISIT (OUTPATIENT)
Dept: POST ACUTE CARE | Facility: EXTERNAL LOCATION | Age: 79
End: 2024-04-23
Payer: MEDICARE

## 2024-04-23 DIAGNOSIS — R53.1 WEAKNESS: ICD-10-CM

## 2024-04-23 DIAGNOSIS — I10 HYPERTENSION, ESSENTIAL: Primary | ICD-10-CM

## 2024-04-23 DIAGNOSIS — S32.512D FRACTURE OF SUPERIOR RIM OF LEFT PUBIS, SUBSEQUENT ENCOUNTER FOR FRACTURE WITH ROUTINE HEALING: ICD-10-CM

## 2024-04-23 PROCEDURE — 99308 SBSQ NF CARE LOW MDM 20: CPT | Performed by: REGISTERED NURSE

## 2024-04-23 NOTE — LETTER
Patient: Silvia Wade  : 1945    Encounter Date: 2024    PROGRESS NOTE    Subjective  Chief complaint: Silvia Wade is a 79 y.o. female who is an acute skilled patient being seen and evaluated for weakness    HPI:  24 Patient admitted to SNF for therapy d/t weakness after recent hospitalization.   Patient requires assist with ADLs and transfers.  No new complaints.      24 Patient has no new complaints or concerns today.  Patient is working in therapy.  Denies n/v/f/c.    24 Therapy has been working with the patient to improve strength and endurance with ADLs, transfers, and mobility.  Patient continues to work toward goals.  Patient is stable and has no new complaints.  Nursing staff voices no new concerns today.    24 Patient has been working in therapy to improve strength, endurance, and ADLs.  Patient continues to work toward goals.  No new concerns today.  Denies n/v/f/c pain.      Objective  Vital signs: 134/71, 97.7, 76, 18, 96%    Physical Exam  Constitutional:       General: She is not in acute distress.  Eyes:      Extraocular Movements: Extraocular movements intact.   Pulmonary:      Effort: Pulmonary effort is normal.   Musculoskeletal:      Cervical back: Neck supple.   Neurological:      Mental Status: She is alert.   Psychiatric:         Mood and Affect: Mood normal.         Behavior: Behavior is cooperative.         Assessment/Plan  Problem List Items Addressed This Visit       Fracture of superior rim of left pubis, subsequent encounter for fracture with routine healing      Stable   pain management   Therapy   wheelchair for mobility         Weakness     Pt/ot          Hypertension, essential - Primary      BP at goal   losartan hydrochlorothiazide   monitor BP   routine BMP             Medications, treatments, and labs reviewed  Continue medications and treatments as listed in [facility]    Scribe Attestation  I, Gina Shaw   attest that this documentation  has been prepared under the direction and in the presence of DOMITILA Stafford.    Provider Attestation - Scribe documentation  All medical record entries made by the Scribe were at my direction and personally dictated by me. I have reviewed the chart and agree that the record accurately reflects my personal performance of the history, physical exam, discussion and plan.    DOMITILA Stafford        Electronically Signed By: DOMITILA Stafford   5/20/24  6:09 PM

## 2024-04-23 NOTE — PROGRESS NOTES
PROGRESS NOTE    Subjective   Chief complaint: Silvia Wade is a 79 y.o. female who is an acute skilled patient being seen and evaluated for weakness    HPI:  4/16/24 Patient admitted to SNF for therapy d/t weakness after recent hospitalization.   Patient requires assist with ADLs and transfers.  No new complaints.      4/17/24 Patient has no new complaints or concerns today.  Patient is working in therapy.  Denies n/v/f/c.    4/22/24 Therapy has been working with the patient to improve strength and endurance with ADLs, transfers, and mobility.  Patient continues to work toward goals.  Patient is stable and has no new complaints.  Nursing staff voices no new concerns today.    Objective   Vital signs: 134/71, 97.7, 76, 18, 96%    Physical Exam  Constitutional:       General: She is not in acute distress.  Eyes:      Extraocular Movements: Extraocular movements intact.   Pulmonary:      Effort: Pulmonary effort is normal.   Musculoskeletal:      Cervical back: Neck supple.   Neurological:      Mental Status: She is alert.   Psychiatric:         Mood and Affect: Mood normal.         Behavior: Behavior is cooperative.         Assessment/Plan   Problem List Items Addressed This Visit       Fracture of superior rim of left pubis, subsequent encounter for fracture with routine healing      Stable   pain management   Therapy   wheelchair for mobility         Weakness - Primary     Pt/ot          Neuropathy     Stable   Continue current medications          Hypertension, essential      BP at goal   losartan hydrochlorothiazide   monitor BP   routine BMP             Medications, treatments, and labs reviewed  Continue medications and treatments as listed in [facility]    Scribe Attestation  I, Gina Shaw   attest that this documentation has been prepared under the direction and in the presence of DOMITILA Stafford.    Provider Attestation - Scribe documentation  All medical record entries made by the  Scribe were at my direction and personally dictated by me. I have reviewed the chart and agree that the record accurately reflects my personal performance of the history, physical exam, discussion and plan.    Rufino Collins, APRN-CNP

## 2024-04-24 ENCOUNTER — NURSING HOME VISIT (OUTPATIENT)
Dept: POST ACUTE CARE | Facility: EXTERNAL LOCATION | Age: 79
End: 2024-04-24
Payer: MEDICARE

## 2024-04-24 DIAGNOSIS — R53.1 WEAKNESS: Primary | ICD-10-CM

## 2024-04-24 DIAGNOSIS — S32.512D FRACTURE OF SUPERIOR RIM OF LEFT PUBIS, SUBSEQUENT ENCOUNTER FOR FRACTURE WITH ROUTINE HEALING: ICD-10-CM

## 2024-04-24 DIAGNOSIS — F01.B0 MODERATE VASCULAR DEMENTIA WITHOUT BEHAVIORAL DISTURBANCE, PSYCHOTIC DISTURBANCE, MOOD DISTURBANCE, OR ANXIETY (MULTI): ICD-10-CM

## 2024-04-24 PROCEDURE — 99309 SBSQ NF CARE MODERATE MDM 30: CPT | Performed by: REGISTERED NURSE

## 2024-04-24 NOTE — PROGRESS NOTES
PROGRESS NOTE    Subjective   Chief complaint: Silvia Wade is a 79 y.o. female who is an acute skilled patient being seen and evaluated for weakness    HPI:  4/16/24 Patient admitted to SNF for therapy d/t weakness after recent hospitalization.   Patient requires assist with ADLs and transfers.  No new complaints.      4/17/24 Patient has no new complaints or concerns today.  Patient is working in therapy.  Denies n/v/f/c.    4/22/24 Therapy has been working with the patient to improve strength and endurance with ADLs, transfers, and mobility.  Patient continues to work toward goals.  Patient is stable and has no new complaints.  Nursing staff voices no new concerns today.    4/23/24 Patient has been working in therapy to improve strength, endurance, and ADLs.  Patient continues to work toward goals.  No new concerns today.  Denies n/v/f/c pain.      Objective   Vital signs: 134/71, 97.7, 76, 18, 96%    Physical Exam  Constitutional:       General: She is not in acute distress.  Eyes:      Extraocular Movements: Extraocular movements intact.   Pulmonary:      Effort: Pulmonary effort is normal.   Musculoskeletal:      Cervical back: Neck supple.   Neurological:      Mental Status: She is alert.   Psychiatric:         Mood and Affect: Mood normal.         Behavior: Behavior is cooperative.         Assessment/Plan   Problem List Items Addressed This Visit       Fracture of superior rim of left pubis, subsequent encounter for fracture with routine healing      Stable   pain management   Therapy   wheelchair for mobility         Weakness     Pt/ot          Hypertension, essential - Primary      BP at goal   losartan hydrochlorothiazide   monitor BP   routine BMP             Medications, treatments, and labs reviewed  Continue medications and treatments as listed in [facility]    Scribe Attestation  I, Gina Shaw   attest that this documentation has been prepared under the direction and in the presence of  Rufino Collins, APRN-CNP.    Provider Attestation - Scribe documentation  All medical record entries made by the Scribe were at my direction and personally dictated by me. I have reviewed the chart and agree that the record accurately reflects my personal performance of the history, physical exam, discussion and plan.    Rufino Collins, APRN-CNP

## 2024-04-24 NOTE — LETTER
Patient: Silvia Wade  : 1945    Encounter Date: 2024    PROGRESS NOTE    Subjective  Chief complaint: Silvia Wade is a 79 y.o. female who is an acute skilled patient being seen and evaluated for weakness    HPI:  24 Patient admitted to SNF for therapy d/t weakness after recent hospitalization.   Patient requires assist with ADLs and transfers.  No new complaints.      24 Patient has no new complaints or concerns today.  Patient is working in therapy.  Denies n/v/f/c.    24 Therapy has been working with the patient to improve strength and endurance with ADLs, transfers, and mobility.  Patient continues to work toward goals.  Patient is stable and has no new complaints.  Nursing staff voices no new concerns today.    24 Patient has been working in therapy to improve strength, endurance, and ADLs.  Patient continues to work toward goals.  No new concerns today.  Denies n/v/f/c pain.      24 Patient presents for f/u therapy and general medical care.  Patient seen and examined at beside.  Therapy has been working with the patient to improve strength, endurance, ADLs, and transfers d/t generalized weakness.  Patient has no acute concerns today.    Objective  Vital signs: 134/71, 97.7, 76, 18, 96%    Physical Exam  Constitutional:       General: She is not in acute distress.  Eyes:      Extraocular Movements: Extraocular movements intact.   Pulmonary:      Effort: Pulmonary effort is normal.   Musculoskeletal:      Cervical back: Neck supple.   Neurological:      Mental Status: She is alert.   Psychiatric:         Mood and Affect: Mood normal.         Behavior: Behavior is cooperative.         Assessment/Plan  Problem List Items Addressed This Visit       Moderate vascular dementia without behavioral disturbance, psychotic disturbance, mood disturbance, or anxiety (Multi)      maintain safety in secured MCU   Xanax as needed         Fracture of superior rim of left pubis,  subsequent encounter for fracture with routine healing      Stable   pain management   Therapy   wheelchair for mobility         Weakness - Primary     Pt/ot           Medications, treatments, and labs reviewed  Continue medications and treatments as listed in PCC    Scribe Attestation  I, Gina Shaw   attest that this documentation has been prepared under the direction and in the presence of DOMITILA Stafford.    Provider Attestation - Scribe documentation  All medical record entries made by the Scribe were at my direction and personally dictated by me. I have reviewed the chart and agree that the record accurately reflects my personal performance of the history, physical exam, discussion and plan.    DOMITILA Stafford        Electronically Signed By: DOMITILA Stafford   5/23/24 11:20 AM

## 2024-04-25 ENCOUNTER — NURSING HOME VISIT (OUTPATIENT)
Dept: POST ACUTE CARE | Facility: EXTERNAL LOCATION | Age: 79
End: 2024-04-25
Payer: MEDICARE

## 2024-04-25 DIAGNOSIS — R53.1 WEAKNESS: ICD-10-CM

## 2024-04-25 DIAGNOSIS — I10 HYPERTENSION, ESSENTIAL: ICD-10-CM

## 2024-04-25 DIAGNOSIS — R60.0 LOCALIZED EDEMA: Primary | ICD-10-CM

## 2024-04-25 DIAGNOSIS — F01.B0 MODERATE VASCULAR DEMENTIA WITHOUT BEHAVIORAL DISTURBANCE, PSYCHOTIC DISTURBANCE, MOOD DISTURBANCE, OR ANXIETY (MULTI): ICD-10-CM

## 2024-04-25 DIAGNOSIS — K59.01 SLOW TRANSIT CONSTIPATION: ICD-10-CM

## 2024-04-25 PROCEDURE — 99309 SBSQ NF CARE MODERATE MDM 30: CPT

## 2024-04-25 NOTE — LETTER
Patient: Silvia Wade  : 1945    Encounter Date: 2024    PROGRESS NOTE    Subjective  Chief complaint: Silvia Wade is a 79 y.o. female who is an acute skilled patient being seen and evaluated for weakness    HPI:  24 Patient admitted to SNF for therapy d/t weakness after recent hospitalization.   Patient requires assist with ADLs and transfers.  No new complaints.      24 Patient has no new complaints or concerns today.  Patient is working in therapy.  Denies n/v/f/c.    24 Therapy has been working with the patient to improve strength and endurance with ADLs, transfers, and mobility.  Patient continues to work toward goals.  Patient is stable and has no new complaints.  Nursing staff voices no new concerns today.    24 Patient has been working in therapy to improve strength, endurance, and ADLs.  Patient continues to work toward goals.  No new concerns today.  Denies n/v/f/c pain.      24 Patient presents for f/u therapy and general medical care.  Patient seen and examined at [facility].  Therapy has been working with the patient to improve strength, endurance, ADLs, and transfers d/t generalized weakness.  Patient has no acute concerns today.    24. Continues to work with therapy for mobility, transferring, strengthening.  Edema noted in L LE.  No injury.  MSP intact.  No concerns per nursing          Objective  Vital signs:  134/71-97.7-76-18-96%    Physical Exam  Constitutional:       Appearance: Normal appearance.   HENT:      Head: Normocephalic.      Mouth/Throat:      Mouth: Mucous membranes are moist.   Eyes:      Extraocular Movements: Extraocular movements intact.   Cardiovascular:      Rate and Rhythm: Normal rate and regular rhythm.      Pulses: Normal pulses.      Heart sounds: Murmur heard.   Pulmonary:      Effort: Pulmonary effort is normal.      Breath sounds: Normal breath sounds.   Abdominal:      General: Bowel sounds are normal.      Palpations:  Abdomen is soft.   Musculoskeletal:         General: Normal range of motion.      Cervical back: Normal range of motion and neck supple.      Left lower leg: Edema present.      Comments:  generalized weakness   Skin:     General: Skin is warm and dry.      Capillary Refill: Capillary refill takes less than 2 seconds.   Neurological:      Mental Status: She is alert. Mental status is at baseline.   Psychiatric:         Mood and Affect: Mood normal.         Behavior: Behavior normal.         Assessment/Plan  Problem List Items Addressed This Visit       Moderate vascular dementia without behavioral disturbance, psychotic disturbance, mood disturbance, or anxiety (Multi)      maintain safety in secured MCU   Xanax as needed         Weakness      therapy         Constipation     continue bisacodyl, MOM  encourage p.o. fluid intake         Hypertension, essential      BP at goal   losartan hydrochlorothiazide   monitor BP   routine BMP         Localized edema - Primary      atraumatic LLE edema   US to R/O DVT          Medications, treatments, and labs reviewed  Continue medications and treatments as listed in EMR    ODMITILA Landon      Electronically Signed By: DOMITILA Landon   4/26/24  9:17 AM

## 2024-04-25 NOTE — PROGRESS NOTES
PROGRESS NOTE    Subjective   Chief complaint: Silvia Wade is a 79 y.o. female who is an acute skilled patient being seen and evaluated for weakness    HPI:  4/16/24 Patient admitted to SNF for therapy d/t weakness after recent hospitalization.   Patient requires assist with ADLs and transfers.  No new complaints.      4/17/24 Patient has no new complaints or concerns today.  Patient is working in therapy.  Denies n/v/f/c.    4/22/24 Therapy has been working with the patient to improve strength and endurance with ADLs, transfers, and mobility.  Patient continues to work toward goals.  Patient is stable and has no new complaints.  Nursing staff voices no new concerns today.    4/23/24 Patient has been working in therapy to improve strength, endurance, and ADLs.  Patient continues to work toward goals.  No new concerns today.  Denies n/v/f/c pain.      4/24/24 Patient presents for f/u therapy and general medical care.  Patient seen and examined at beside.  Therapy has been working with the patient to improve strength, endurance, ADLs, and transfers d/t generalized weakness.  Patient has no acute concerns today.    Objective   Vital signs: 134/71, 97.7, 76, 18, 96%    Physical Exam  Constitutional:       General: She is not in acute distress.  Eyes:      Extraocular Movements: Extraocular movements intact.   Pulmonary:      Effort: Pulmonary effort is normal.   Musculoskeletal:      Cervical back: Neck supple.   Neurological:      Mental Status: She is alert.   Psychiatric:         Mood and Affect: Mood normal.         Behavior: Behavior is cooperative.         Assessment/Plan   Problem List Items Addressed This Visit       Moderate vascular dementia without behavioral disturbance, psychotic disturbance, mood disturbance, or anxiety (Multi)      maintain safety in secured MCU   Xanax as needed         Fracture of superior rim of left pubis, subsequent encounter for fracture with routine healing      Stable   pain  management   Therapy   wheelchair for mobility         Weakness - Primary     Pt/ot           Medications, treatments, and labs reviewed  Continue medications and treatments as listed in PCC    Scribe Attestation  IRubi Scribe   attest that this documentation has been prepared under the direction and in the presence of DOMITILA Stafford.    Provider Attestation - Scribe documentation  All medical record entries made by the Scribe were at my direction and personally dictated by me. I have reviewed the chart and agree that the record accurately reflects my personal performance of the history, physical exam, discussion and plan.    RICARDO Stafford-CNP

## 2024-04-26 ENCOUNTER — NURSING HOME VISIT (OUTPATIENT)
Dept: POST ACUTE CARE | Facility: EXTERNAL LOCATION | Age: 79
End: 2024-04-26
Payer: MEDICARE

## 2024-04-26 DIAGNOSIS — I10 HYPERTENSION, ESSENTIAL: ICD-10-CM

## 2024-04-26 DIAGNOSIS — R60.0 LOCALIZED EDEMA: Primary | ICD-10-CM

## 2024-04-26 DIAGNOSIS — R53.1 WEAKNESS: ICD-10-CM

## 2024-04-26 DIAGNOSIS — F01.B0 MODERATE VASCULAR DEMENTIA WITHOUT BEHAVIORAL DISTURBANCE, PSYCHOTIC DISTURBANCE, MOOD DISTURBANCE, OR ANXIETY (MULTI): ICD-10-CM

## 2024-04-26 DIAGNOSIS — S32.512D FRACTURE OF SUPERIOR RIM OF LEFT PUBIS, SUBSEQUENT ENCOUNTER FOR FRACTURE WITH ROUTINE HEALING: ICD-10-CM

## 2024-04-26 PROBLEM — K59.00 CONSTIPATION: Status: ACTIVE | Noted: 2024-04-26

## 2024-04-26 PROBLEM — G62.9 NEUROPATHY: Status: ACTIVE | Noted: 2024-04-26

## 2024-04-26 PROBLEM — E06.3 HYPOTHYROIDISM DUE TO HASHIMOTO'S THYROIDITIS: Status: ACTIVE | Noted: 2024-04-26

## 2024-04-26 PROBLEM — E03.8 HYPOTHYROIDISM DUE TO HASHIMOTO'S THYROIDITIS: Status: ACTIVE | Noted: 2024-04-26

## 2024-04-26 PROBLEM — N39.0 UTI (URINARY TRACT INFECTION): Status: ACTIVE | Noted: 2024-04-26

## 2024-04-26 PROBLEM — F33.0 MILD EPISODE OF RECURRENT MAJOR DEPRESSIVE DISORDER (CMS-HCC): Status: ACTIVE | Noted: 2024-04-26

## 2024-04-26 PROCEDURE — 99308 SBSQ NF CARE LOW MDM 20: CPT

## 2024-04-26 NOTE — PROGRESS NOTES
PROGRESS NOTE    Subjective   Chief complaint: Silvia Wade is a 79 y.o. female who is an acute skilled patient being seen and evaluated for weakness    HPI:  4/16/24 Patient admitted to SNF for therapy d/t weakness after recent hospitalization.   Patient requires assist with ADLs and transfers.  No new complaints.      4/17/24 Patient has no new complaints or concerns today.  Patient is working in therapy.  Denies n/v/f/c.    4/18/24.  Patient states doing well.  Continues to work with therapy for mobility, transferring, strengthening.  No F/C/N/V/D, SOB, cough.  No concerns per nursing    4/19/24 Continues to work with therapy for mobility and transferring.   Pain managed with current therapy.  Safety maintained in MCU.  No F/C/N/V/D, SOB, cough.  No concerns per nursing          Objective   Vital signs: 125/73-97.6-87-18-97%    Physical Exam  Constitutional:       Appearance: Normal appearance.   HENT:      Head: Normocephalic.      Mouth/Throat:      Mouth: Mucous membranes are moist.   Eyes:      Extraocular Movements: Extraocular movements intact.   Cardiovascular:      Rate and Rhythm: Normal rate and regular rhythm.      Pulses: Normal pulses.      Heart sounds: Murmur heard.   Pulmonary:      Effort: Pulmonary effort is normal.      Breath sounds: Normal breath sounds.   Abdominal:      General: Bowel sounds are normal.      Palpations: Abdomen is soft.   Musculoskeletal:         General: Normal range of motion.      Cervical back: Normal range of motion and neck supple.      Comments:  generalized weakness   Skin:     General: Skin is warm and dry.      Capillary Refill: Capillary refill takes less than 2 seconds.   Neurological:      Mental Status: She is alert. Mental status is at baseline.   Psychiatric:         Mood and Affect: Mood normal.         Behavior: Behavior normal.         Assessment/Plan   Problem List Items Addressed This Visit       Moderate vascular dementia without behavioral  disturbance, psychotic disturbance, mood disturbance, or anxiety (Multi)      maintain safety in secured MCU   Xanax as needed         Fracture of superior rim of left pubis, subsequent encounter for fracture with routine healing - Primary      Stable   pain management   Therapy   wheelchair for mobility         Weakness      therapy         Hypertension, essential      BP at goal   losartan hydrochlorothiazide   monitor BP   routine BMP          Medications, treatments, and labs reviewed  Continue medications and treatments as listed in EMR    Cindy Driscoll, APRN-CNP

## 2024-04-26 NOTE — PROGRESS NOTES
PROGRESS NOTE    Subjective   Chief complaint: Silvia Wade is a 79 y.o. female who is an acute skilled patient being seen and evaluated for weakness    HPI:  4/16/24 Patient admitted to SNF for therapy d/t weakness after recent hospitalization.   Patient requires assist with ADLs and transfers.  No new complaints.      4/17/24 Patient has no new complaints or concerns today.  Patient is working in therapy.  Denies n/v/f/c.    4/22/24 Therapy has been working with the patient to improve strength and endurance with ADLs, transfers, and mobility.  Patient continues to work toward goals.  Patient is stable and has no new complaints.  Nursing staff voices no new concerns today.    4/23/24 Patient has been working in therapy to improve strength, endurance, and ADLs.  Patient continues to work toward goals.  No new concerns today.  Denies n/v/f/c pain.      4/24/24 Patient presents for f/u therapy and general medical care.  Patient seen and examined at beside.  Therapy has been working with the patient to improve strength, endurance, ADLs, and transfers d/t generalized weakness.  Patient has no acute concerns today.    4/25/24. Continues to work with therapy for mobility, transferring, strengthening.  Edema noted in L LE.  No injury.  MSP intact.  No concerns per nursing          Objective   Vital signs:  134/71-97.7-76-18-96%    Physical Exam  Constitutional:       Appearance: Normal appearance.   HENT:      Head: Normocephalic.      Mouth/Throat:      Mouth: Mucous membranes are moist.   Eyes:      Extraocular Movements: Extraocular movements intact.   Cardiovascular:      Rate and Rhythm: Normal rate and regular rhythm.      Pulses: Normal pulses.      Heart sounds: Murmur heard.   Pulmonary:      Effort: Pulmonary effort is normal.      Breath sounds: Normal breath sounds.   Abdominal:      General: Bowel sounds are normal.      Palpations: Abdomen is soft.   Musculoskeletal:         General: Normal range of  motion.      Cervical back: Normal range of motion and neck supple.      Left lower leg: Edema present.      Comments:  generalized weakness   Skin:     General: Skin is warm and dry.      Capillary Refill: Capillary refill takes less than 2 seconds.   Neurological:      Mental Status: She is alert. Mental status is at baseline.   Psychiatric:         Mood and Affect: Mood normal.         Behavior: Behavior normal.         Assessment/Plan   Problem List Items Addressed This Visit       Moderate vascular dementia without behavioral disturbance, psychotic disturbance, mood disturbance, or anxiety (Multi)      maintain safety in secured MCU   Xanax as needed         Weakness      therapy         Constipation     continue bisacodyl, MOM  encourage p.o. fluid intake         Hypertension, essential      BP at goal   losartan hydrochlorothiazide   monitor BP   routine BMP         Localized edema - Primary      atraumatic LLE edema   US to R/O DVT          Medications, treatments, and labs reviewed  Continue medications and treatments as listed in EMR    RICARDO Landon-CNP

## 2024-04-26 NOTE — LETTER
Patient: Silvia Wade  : 1945    Encounter Date: 2024    PROGRESS NOTE    Subjective  Chief complaint: Silvia Wade is a 79 y.o. female who is an acute skilled patient being seen and evaluated for weakness    HPI:  24 Patient admitted to SNF for therapy d/t weakness after recent hospitalization.   Patient requires assist with ADLs and transfers.  No new complaints.      24 Patient has no new complaints or concerns today.  Patient is working in therapy.  Denies n/v/f/c.    24 Therapy has been working with the patient to improve strength and endurance with ADLs, transfers, and mobility.  Patient continues to work toward goals.  Patient is stable and has no new complaints.  Nursing staff voices no new concerns today.    24 Patient has been working in therapy to improve strength, endurance, and ADLs.  Patient continues to work toward goals.  No new concerns today.  Denies n/v/f/c pain.      24 Patient presents for f/u therapy and general medical care.  Patient seen and examined at [facility].  Therapy has been working with the patient to improve strength, endurance, ADLs, and transfers d/t generalized weakness.  Patient has no acute concerns today.    24. Continues to work with therapy for mobility, transferring, strengthening.  Edema noted in L LE.  No injury.  MSP intact.  No concerns per nursing    24..  Patient states doing well today.  Continues to work with therapy for mobility and strengthening.  US LLE negative for DVT.  BP within goal.  No F/C/N/V/D, SOB, cough.  No concerns per nursing          Objective  Vital signs:  134/71-97.7-76-18-96%    Physical Exam  Constitutional:       Appearance: Normal appearance.   HENT:      Head: Normocephalic.      Mouth/Throat:      Mouth: Mucous membranes are moist.   Eyes:      Extraocular Movements: Extraocular movements intact.   Cardiovascular:      Rate and Rhythm: Normal rate and regular rhythm.      Pulses: Normal  pulses.      Heart sounds: Murmur heard.   Pulmonary:      Effort: Pulmonary effort is normal.      Breath sounds: Normal breath sounds.   Abdominal:      General: Bowel sounds are normal.      Palpations: Abdomen is soft.   Musculoskeletal:         General: Normal range of motion.      Cervical back: Normal range of motion and neck supple.      Left lower leg: Edema present.      Comments:  generalized weakness   Skin:     General: Skin is warm and dry.      Capillary Refill: Capillary refill takes less than 2 seconds.   Neurological:      Mental Status: She is alert. Mental status is at baseline.   Psychiatric:         Mood and Affect: Mood normal.         Behavior: Behavior normal.         Assessment/Plan  Problem List Items Addressed This Visit       Moderate vascular dementia without behavioral disturbance, psychotic disturbance, mood disturbance, or anxiety (Multi)      maintain safety in secured MCU   Xanax as needed         Fracture of superior rim of left pubis, subsequent encounter for fracture with routine healing      Stable   pain management   Therapy   wheelchair for mobility         Weakness      therapy         Hypertension, essential      BP at goal   losartan hydrochlorothiazide   monitor BP   routine BMP         Localized edema - Primary      atraumatic LLE edema   US to R/O DVTnegative  monitor          Medications, treatments, and labs reviewed  Continue medications and treatments as listed in EMR    DOMITILA Landon      Electronically Signed By: DOMITILA Landon   5/17/24  9:03 PM

## 2024-04-26 NOTE — PROGRESS NOTES
PROGRESS NOTE    Subjective   Chief complaint: Silvia Wade is a 79 y.o. female who is an acute skilled patient being seen and evaluated for weakness    HPI:  4/16/24 Patient admitted to SNF for therapy d/t weakness after recent hospitalization.   Patient requires assist with ADLs and transfers.  No new complaints.      4/17/24 Patient has no new complaints or concerns today.  Patient is working in therapy.  Denies n/v/f/c.    4/18/24.  Patient states doing well.  Continues to work with therapy for mobility, transferring, strengthening.  No F/C/N/V/D, SOB, cough.  No concerns per nursing          Objective   Vital signs:  125/73-97.6-87-18-97%    Physical Exam  Constitutional:       Appearance: Normal appearance.   HENT:      Head: Normocephalic.      Mouth/Throat:      Mouth: Mucous membranes are moist.   Eyes:      Extraocular Movements: Extraocular movements intact.   Cardiovascular:      Rate and Rhythm: Normal rate and regular rhythm.      Pulses: Normal pulses.      Heart sounds: Murmur heard.   Pulmonary:      Effort: Pulmonary effort is normal.      Breath sounds: Normal breath sounds.   Abdominal:      General: Bowel sounds are normal.      Palpations: Abdomen is soft.   Musculoskeletal:         General: Normal range of motion.      Cervical back: Normal range of motion and neck supple.      Comments:  generalized weakness   Skin:     General: Skin is warm and dry.      Capillary Refill: Capillary refill takes less than 2 seconds.   Neurological:      Mental Status: She is alert. Mental status is at baseline.   Psychiatric:         Mood and Affect: Mood normal.         Behavior: Behavior normal.         Assessment/Plan   Problem List Items Addressed This Visit       Moderate vascular dementia without behavioral disturbance, psychotic disturbance, mood disturbance, or anxiety (Multi)      maintain safety in secured MCU   Xanax as needed         Fracture of superior rim of left pubis, subsequent encounter  for fracture with routine healing - Primary      Stable   pain management   Therapy   wheelchair for mobility         Weakness      therapy         Hypertension, essential      BP at goal   losartan hydrochlorothiazide   monitor BP   routine BMP         Mild episode of recurrent major depressive disorder (CMS-HCC)      No SI HI or self isolation   Engages with other residents in activities and meals   Zoloft          Medications, treatments, and labs reviewed  Continue medications and treatments as listed in EMR    Cindy Driscoll, APRN-CNP

## 2024-04-29 ENCOUNTER — NURSING HOME VISIT (OUTPATIENT)
Dept: POST ACUTE CARE | Facility: EXTERNAL LOCATION | Age: 79
End: 2024-04-29
Payer: MEDICARE

## 2024-04-29 DIAGNOSIS — S32.512D FRACTURE OF SUPERIOR RIM OF LEFT PUBIS, SUBSEQUENT ENCOUNTER FOR FRACTURE WITH ROUTINE HEALING: ICD-10-CM

## 2024-04-29 DIAGNOSIS — I10 HYPERTENSION, ESSENTIAL: ICD-10-CM

## 2024-04-29 DIAGNOSIS — F01.B0 MODERATE VASCULAR DEMENTIA WITHOUT BEHAVIORAL DISTURBANCE, PSYCHOTIC DISTURBANCE, MOOD DISTURBANCE, OR ANXIETY (MULTI): ICD-10-CM

## 2024-04-29 DIAGNOSIS — R53.1 WEAKNESS: Primary | ICD-10-CM

## 2024-04-29 PROCEDURE — 99309 SBSQ NF CARE MODERATE MDM 30: CPT | Performed by: REGISTERED NURSE

## 2024-04-29 NOTE — LETTER
Patient: Silvia Wade  : 1945    Encounter Date: 2024    PROGRESS NOTE    Subjective  Chief complaint: Silvia Wade is a 79 y.o. female patient being seen and evaluated for monthly general medical care and follow-up    HPI:  24 Patient presents for general medical care and f/u.  Patient seen and examined at bedside.  No issues per nursing.  Patient has no acute complaints.        Objective  Vital signs: 134/71, 97.7, 76, 18, 96%    Physical Exam  Constitutional:       General: She is not in acute distress.  Eyes:      Extraocular Movements: Extraocular movements intact.   Pulmonary:      Effort: Pulmonary effort is normal.   Musculoskeletal:      Cervical back: Neck supple.   Neurological:      Mental Status: She is alert.   Psychiatric:         Mood and Affect: Mood normal.         Behavior: Behavior is cooperative.         Assessment/Plan  Problem List Items Addressed This Visit       Moderate vascular dementia without behavioral disturbance, psychotic disturbance, mood disturbance, or anxiety (Multi)      maintain safety in secured MCU   Xanax as needed         Fracture of superior rim of left pubis, subsequent encounter for fracture with routine healing      Stable   pain management   Therapy   wheelchair for mobility         Weakness - Primary     Pt/ot          Hypertension, essential      BP at goal   losartan hydrochlorothiazide   monitor BP   routine BMP             Medications, treatments, and labs reviewed  Continue medications and treatments as listed in EMR    Scribe Attestation  I, Gina Shaw   attest that this documentation has been prepared under the direction and in the presence of DOMITILA Stafford.    Provider Attestation - Scribe documentation  All medical record entries made by the Scribe were at my direction and personally dictated by me. I have reviewed the chart and agree that the record accurately reflects my personal performance of the  history, physical exam, discussion and plan.    DOMITILA Stafford      Electronically Signed By: DOMITILA Stafford   5/16/24 10:00 AM

## 2024-04-30 NOTE — PROGRESS NOTES
PROGRESS NOTE    Subjective   Chief complaint: Silvia Wade is a 79 y.o. female patient being seen and evaluated for monthly general medical care and follow-up    HPI:  4/29/24 Patient presents for general medical care and f/u.  Patient seen and examined at bedside.  No issues per nursing.  Patient has no acute complaints.        Objective   Vital signs: 134/71, 97.7, 76, 18, 96%    Physical Exam  Constitutional:       General: She is not in acute distress.  Eyes:      Extraocular Movements: Extraocular movements intact.   Pulmonary:      Effort: Pulmonary effort is normal.   Musculoskeletal:      Cervical back: Neck supple.   Neurological:      Mental Status: She is alert.   Psychiatric:         Mood and Affect: Mood normal.         Behavior: Behavior is cooperative.         Assessment/Plan   Problem List Items Addressed This Visit       Moderate vascular dementia without behavioral disturbance, psychotic disturbance, mood disturbance, or anxiety (Multi)      maintain safety in secured MCU   Xanax as needed         Fracture of superior rim of left pubis, subsequent encounter for fracture with routine healing      Stable   pain management   Therapy   wheelchair for mobility         Weakness - Primary     Pt/ot          Hypertension, essential      BP at goal   losartan hydrochlorothiazide   monitor BP   routine BMP             Medications, treatments, and labs reviewed  Continue medications and treatments as listed in EMR    Scribe Attestation  IRubi Scribe   attest that this documentation has been prepared under the direction and in the presence of DOMITILA Stafford.    Provider Attestation - Scribe documentation  All medical record entries made by the Scribe were at my direction and personally dictated by me. I have reviewed the chart and agree that the record accurately reflects my personal performance of the history, physical exam, discussion and plan.    Rufino Collins,  APRN-CNP

## 2024-05-18 NOTE — PROGRESS NOTES
PROGRESS NOTE    Subjective   Chief complaint: Silvia Wade is a 79 y.o. female who is an acute skilled patient being seen and evaluated for weakness    HPI:  4/16/24 Patient admitted to SNF for therapy d/t weakness after recent hospitalization.   Patient requires assist with ADLs and transfers.  No new complaints.      4/17/24 Patient has no new complaints or concerns today.  Patient is working in therapy.  Denies n/v/f/c.    4/22/24 Therapy has been working with the patient to improve strength and endurance with ADLs, transfers, and mobility.  Patient continues to work toward goals.  Patient is stable and has no new complaints.  Nursing staff voices no new concerns today.    4/23/24 Patient has been working in therapy to improve strength, endurance, and ADLs.  Patient continues to work toward goals.  No new concerns today.  Denies n/v/f/c pain.      4/24/24 Patient presents for f/u therapy and general medical care.  Patient seen and examined at [facility].  Therapy has been working with the patient to improve strength, endurance, ADLs, and transfers d/t generalized weakness.  Patient has no acute concerns today.    4/25/24. Continues to work with therapy for mobility, transferring, strengthening.  Edema noted in L LE.  No injury.  MSP intact.  No concerns per nursing    4/26/24..  Patient states doing well today.  Continues to work with therapy for mobility and strengthening.  US LLE negative for DVT.  BP within goal.  No F/C/N/V/D, SOB, cough.  No concerns per nursing          Objective   Vital signs:  134/71-97.7-76-18-96%    Physical Exam  Constitutional:       Appearance: Normal appearance.   HENT:      Head: Normocephalic.      Mouth/Throat:      Mouth: Mucous membranes are moist.   Eyes:      Extraocular Movements: Extraocular movements intact.   Cardiovascular:      Rate and Rhythm: Normal rate and regular rhythm.      Pulses: Normal pulses.      Heart sounds: Murmur heard.   Pulmonary:      Effort:  Pulmonary effort is normal.      Breath sounds: Normal breath sounds.   Abdominal:      General: Bowel sounds are normal.      Palpations: Abdomen is soft.   Musculoskeletal:         General: Normal range of motion.      Cervical back: Normal range of motion and neck supple.      Left lower leg: Edema present.      Comments:  generalized weakness   Skin:     General: Skin is warm and dry.      Capillary Refill: Capillary refill takes less than 2 seconds.   Neurological:      Mental Status: She is alert. Mental status is at baseline.   Psychiatric:         Mood and Affect: Mood normal.         Behavior: Behavior normal.         Assessment/Plan   Problem List Items Addressed This Visit       Moderate vascular dementia without behavioral disturbance, psychotic disturbance, mood disturbance, or anxiety (Multi)      maintain safety in secured MCU   Xanax as needed         Fracture of superior rim of left pubis, subsequent encounter for fracture with routine healing      Stable   pain management   Therapy   wheelchair for mobility         Weakness      therapy         Hypertension, essential      BP at goal   losartan hydrochlorothiazide   monitor BP   routine BMP         Localized edema - Primary      atraumatic LLE edema   US to R/O DVTnegative  monitor          Medications, treatments, and labs reviewed  Continue medications and treatments as listed in EMR    Cindy Driscoll, APRN-CNP